# Patient Record
Sex: FEMALE | Race: BLACK OR AFRICAN AMERICAN | NOT HISPANIC OR LATINO | URBAN - METROPOLITAN AREA
[De-identification: names, ages, dates, MRNs, and addresses within clinical notes are randomized per-mention and may not be internally consistent; named-entity substitution may affect disease eponyms.]

---

## 2017-10-02 ENCOUNTER — EMERGENCY (EMERGENCY)
Facility: HOSPITAL | Age: 22
LOS: 1 days | Discharge: HOME | End: 2017-10-02
Attending: EMERGENCY MEDICINE
Payer: MEDICAID

## 2017-10-02 VITALS
OXYGEN SATURATION: 98 % | TEMPERATURE: 98 F | DIASTOLIC BLOOD PRESSURE: 78 MMHG | RESPIRATION RATE: 16 BRPM | SYSTOLIC BLOOD PRESSURE: 126 MMHG | HEART RATE: 72 BPM

## 2017-10-02 VITALS
TEMPERATURE: 97 F | HEIGHT: 68 IN | RESPIRATION RATE: 16 BRPM | SYSTOLIC BLOOD PRESSURE: 131 MMHG | WEIGHT: 186.07 LBS | HEART RATE: 73 BPM | DIASTOLIC BLOOD PRESSURE: 91 MMHG | OXYGEN SATURATION: 100 %

## 2017-10-02 DIAGNOSIS — M25.461 EFFUSION, RIGHT KNEE: ICD-10-CM

## 2017-10-02 PROCEDURE — 73562 X-RAY EXAM OF KNEE 3: CPT | Mod: 26,RT

## 2017-10-02 PROCEDURE — 93971 EXTREMITY STUDY: CPT

## 2017-10-02 PROCEDURE — 73562 X-RAY EXAM OF KNEE 3: CPT

## 2017-10-02 PROCEDURE — 93971 EXTREMITY STUDY: CPT | Mod: 26,RT

## 2017-10-02 PROCEDURE — 99283 EMERGENCY DEPT VISIT LOW MDM: CPT

## 2017-10-02 PROCEDURE — 99284 EMERGENCY DEPT VISIT MOD MDM: CPT | Mod: 25

## 2017-10-02 RX ORDER — IBUPROFEN 200 MG
600 TABLET ORAL ONCE
Qty: 0 | Refills: 0 | Status: COMPLETED | OUTPATIENT
Start: 2017-10-02 | End: 2017-10-02

## 2017-10-02 RX ORDER — DICLOFENAC SODIUM 75 MG/1
1 TABLET, DELAYED RELEASE ORAL
Qty: 10 | Refills: 0 | OUTPATIENT
Start: 2017-10-02 | End: 2017-10-07

## 2017-10-02 RX ADMIN — Medication 600 MILLIGRAM(S): at 21:33

## 2017-10-02 RX ADMIN — Medication 600 MILLIGRAM(S): at 20:51

## 2017-10-02 NOTE — ED PROVIDER NOTE - NEUROLOGICAL, MLM
Neuro: CNII-XII intact. Gait steady. Speech clear. Reflexes 2+/4 in all extremities. Strength 5/5 in all extremities. Normal coordination.

## 2017-10-02 NOTE — ED PROVIDER NOTE - LOWER EXTREMITY EXAM, RIGHT
small effusion, mild anterior tenderness, FROM, joint stable, no erythema, no warmth, no posterior or lateral tenderness

## 2017-10-02 NOTE — ED PROVIDER NOTE - OBJECTIVE STATEMENT
Patient reports she was sitting at work today, began to feel pain in her right knee. Intermittently radiates up thigh or down calf. No injury, focal weakness, paresthesias, fever, cp, sob, rash, burning or itching sensation.

## 2017-10-02 NOTE — ED PROVIDER NOTE - MEDICAL DECISION MAKING DETAILS
Patient with atraumatic knee effusion. No evidence of septic arthritis. To f/u with ortho in 1 week.

## 2018-09-01 ENCOUNTER — OUTPATIENT (OUTPATIENT)
Dept: OUTPATIENT SERVICES | Facility: HOSPITAL | Age: 23
LOS: 1 days | End: 2018-09-01
Payer: MEDICAID

## 2018-09-01 PROCEDURE — G9001: CPT

## 2018-09-03 ENCOUNTER — EMERGENCY (EMERGENCY)
Facility: HOSPITAL | Age: 23
LOS: 1 days | Discharge: HOME | End: 2018-09-03
Attending: EMERGENCY MEDICINE
Payer: MEDICAID

## 2018-09-03 VITALS
HEIGHT: 68 IN | RESPIRATION RATE: 18 BRPM | WEIGHT: 205.91 LBS | TEMPERATURE: 99 F | HEART RATE: 88 BPM | SYSTOLIC BLOOD PRESSURE: 151 MMHG | DIASTOLIC BLOOD PRESSURE: 84 MMHG | OXYGEN SATURATION: 100 %

## 2018-09-03 LAB
ALBUMIN SERPL ELPH-MCNC: 3.2 G/DL — LOW (ref 3.5–5)
ANION GAP SERPL CALC-SCNC: 9 MMOL/L — SIGNIFICANT CHANGE UP (ref 5–17)
APPEARANCE UR: SIGNIFICANT CHANGE UP
BASOPHILS # BLD AUTO: 0.2 K/UL — SIGNIFICANT CHANGE UP (ref 0–0.2)
BASOPHILS NFR BLD AUTO: 1.6 % — SIGNIFICANT CHANGE UP (ref 0–2)
BILIRUB UR-MCNC: NEGATIVE — SIGNIFICANT CHANGE UP
BUN SERPL-MCNC: 12 MG/DL — SIGNIFICANT CHANGE UP (ref 7–18)
CALCIUM SERPL-MCNC: 8.9 MG/DL — SIGNIFICANT CHANGE UP (ref 8.4–10.5)
CHLORIDE SERPL-SCNC: 108 MMOL/L — SIGNIFICANT CHANGE UP (ref 96–108)
CO2 SERPL-SCNC: 24 MMOL/L — SIGNIFICANT CHANGE UP (ref 22–31)
COLOR SPEC: YELLOW — SIGNIFICANT CHANGE UP
DIFF PNL FLD: ABNORMAL
EOSINOPHIL # BLD AUTO: 0.2 K/UL — SIGNIFICANT CHANGE UP (ref 0–0.5)
EOSINOPHIL NFR BLD AUTO: 1.9 % — SIGNIFICANT CHANGE UP (ref 0–6)
GLUCOSE SERPL-MCNC: 99 MG/DL — SIGNIFICANT CHANGE UP (ref 70–99)
GLUCOSE UR QL: NEGATIVE — SIGNIFICANT CHANGE UP
HCG UR QL: NEGATIVE — SIGNIFICANT CHANGE UP
HCT VFR BLD CALC: 25.5 % — LOW (ref 34.5–45)
HGB BLD-MCNC: 7.3 G/DL — LOW (ref 11.5–15.5)
KETONES UR-MCNC: NEGATIVE — SIGNIFICANT CHANGE UP
LEUKOCYTE ESTERASE UR-ACNC: ABNORMAL
LYMPHOCYTES # BLD AUTO: 37.5 % — SIGNIFICANT CHANGE UP (ref 13–44)
LYMPHOCYTES # BLD AUTO: 4.9 K/UL — HIGH (ref 1–3.3)
MCHC RBC-ENTMCNC: 23.5 PG — LOW (ref 27–34)
MCHC RBC-ENTMCNC: 28.6 GM/DL — LOW (ref 32–36)
MCV RBC AUTO: 82.2 FL — SIGNIFICANT CHANGE UP (ref 80–100)
MONOCYTES # BLD AUTO: 0.8 K/UL — SIGNIFICANT CHANGE UP (ref 0–0.9)
MONOCYTES NFR BLD AUTO: 6.4 % — SIGNIFICANT CHANGE UP (ref 2–14)
NEUTROPHILS # BLD AUTO: 6.9 K/UL — SIGNIFICANT CHANGE UP (ref 1.8–7.4)
NEUTROPHILS NFR BLD AUTO: 52.6 % — SIGNIFICANT CHANGE UP (ref 43–77)
NITRITE UR-MCNC: NEGATIVE — SIGNIFICANT CHANGE UP
PH UR: 7 — SIGNIFICANT CHANGE UP (ref 5–8)
PLATELET # BLD AUTO: 494 K/UL — HIGH (ref 150–400)
POTASSIUM SERPL-MCNC: 3.8 MMOL/L — SIGNIFICANT CHANGE UP (ref 3.5–5.3)
POTASSIUM SERPL-SCNC: 3.8 MMOL/L — SIGNIFICANT CHANGE UP (ref 3.5–5.3)
PROT UR-MCNC: 30 MG/DL
RBC # BLD: 3.1 M/UL — LOW (ref 3.8–5.2)
RBC # FLD: 16 % — HIGH (ref 10.3–14.5)
SODIUM SERPL-SCNC: 141 MMOL/L — SIGNIFICANT CHANGE UP (ref 135–145)
SP GR SPEC: 1.01 — SIGNIFICANT CHANGE UP (ref 1.01–1.02)
UROBILINOGEN FLD QL: NEGATIVE — SIGNIFICANT CHANGE UP
WBC # BLD: 13 K/UL — HIGH (ref 3.8–10.5)
WBC # FLD AUTO: 13 K/UL — HIGH (ref 3.8–10.5)

## 2018-09-03 PROCEDURE — 99285 EMERGENCY DEPT VISIT HI MDM: CPT | Mod: 25

## 2018-09-03 RX ORDER — SODIUM CHLORIDE 9 MG/ML
3 INJECTION INTRAMUSCULAR; INTRAVENOUS; SUBCUTANEOUS ONCE
Qty: 0 | Refills: 0 | Status: COMPLETED | OUTPATIENT
Start: 2018-09-03 | End: 2018-09-03

## 2018-09-03 RX ORDER — MORPHINE SULFATE 50 MG/1
2 CAPSULE, EXTENDED RELEASE ORAL ONCE
Qty: 0 | Refills: 0 | Status: DISCONTINUED | OUTPATIENT
Start: 2018-09-03 | End: 2018-09-03

## 2018-09-03 RX ORDER — ACETAMINOPHEN 500 MG
1000 TABLET ORAL ONCE
Qty: 0 | Refills: 0 | Status: COMPLETED | OUTPATIENT
Start: 2018-09-03 | End: 2018-09-04

## 2018-09-03 RX ADMIN — SODIUM CHLORIDE 3 MILLILITER(S): 9 INJECTION INTRAMUSCULAR; INTRAVENOUS; SUBCUTANEOUS at 23:35

## 2018-09-03 RX ADMIN — MORPHINE SULFATE 2 MILLIGRAM(S): 50 CAPSULE, EXTENDED RELEASE ORAL at 23:35

## 2018-09-03 NOTE — ED PROVIDER NOTE - PROGRESS NOTE DETAILS
labs show H/H 7.3/25, pelvic US unremarkable  discussed above with patient, pt reports she is currently taking Junel Fe for persistent vaginal bleeding but reports recently she is feeling lightheaded.  GYN consulted who offered patient blood transfusion but patient refused but consents to IV iron infusion prior to discharge home with rx iron tabs. patient advised to f/u with GYN as outpatient for reevaluation.

## 2018-09-03 NOTE — ED PROVIDER NOTE - GASTROINTESTINAL, MLM
Left message for father informing him that the form he dropped off on Monday is completed and ready to be picked up at our .   Moderate tenderness of suprapubic region.

## 2018-09-03 NOTE — ED PROVIDER NOTE - OBJECTIVE STATEMENT
22 y/o F pt with a significant PMHx of ovarian cysts and no PSHx of presents to the ED c/o pelvic pain x 1 week ago and worsened yesterday. Patient reports she has nausea. Patient states LMP was August 25th and green vaginal discharge. Patient denies fever, dysuria, hematuria, or any other complaints. NKDA.

## 2018-09-03 NOTE — ED ADULT NURSE NOTE - NSIMPLEMENTINTERV_GEN_ALL_ED
Implemented All Universal Safety Interventions:  [place] to call system. Call bell, personal items and telephone within reach. Instruct patient to call for assistance. Room bathroom lighting operational. Non-slip footwear when patient is off stretcher. Physically safe environment: no spills, clutter or unnecessary equipment. Stretcher in lowest position, wheels locked, appropriate side rails in place.

## 2018-09-03 NOTE — ED PROVIDER NOTE - MEDICAL DECISION MAKING DETAILS
22 y/o F pt with ovarian cysts presents pelvic pain. Will obtain labs, UA, pelvic US to r/o torsion, give morphine for pain, and reassess.

## 2018-09-03 NOTE — ED PROVIDER NOTE - CONDUCTED A DETAILED DISCUSSION WITH PATIENT AND/OR GUARDIAN REGARDING, MDM
lab results/radiology results radiology results/need for outpatient follow-up/return to ED if symptoms worsen, persist or questions arise/lab results

## 2018-09-04 VITALS
SYSTOLIC BLOOD PRESSURE: 127 MMHG | HEART RATE: 90 BPM | RESPIRATION RATE: 18 BRPM | TEMPERATURE: 98 F | OXYGEN SATURATION: 100 % | DIASTOLIC BLOOD PRESSURE: 85 MMHG

## 2018-09-04 DIAGNOSIS — N93.9 ABNORMAL UTERINE AND VAGINAL BLEEDING, UNSPECIFIED: ICD-10-CM

## 2018-09-04 DIAGNOSIS — D64.9 ANEMIA, UNSPECIFIED: ICD-10-CM

## 2018-09-04 LAB
ALP SERPL-CCNC: 54 U/L — SIGNIFICANT CHANGE UP (ref 40–120)
ALT FLD-CCNC: 34 U/L DA — SIGNIFICANT CHANGE UP (ref 10–60)
AST SERPL-CCNC: 19 U/L — SIGNIFICANT CHANGE UP (ref 10–40)
BILIRUB SERPL-MCNC: 0.1 MG/DL — LOW (ref 0.2–1.2)
C TRACH RRNA SPEC QL NAA+PROBE: SIGNIFICANT CHANGE UP
CREAT SERPL-MCNC: 0.71 MG/DL — SIGNIFICANT CHANGE UP (ref 0.5–1.3)
HCG SERPL-ACNC: <1 MIU/ML — SIGNIFICANT CHANGE UP
N GONORRHOEA RRNA SPEC QL NAA+PROBE: SIGNIFICANT CHANGE UP
PROT SERPL-MCNC: 7.7 G/DL — SIGNIFICANT CHANGE UP (ref 6–8.3)
SPECIMEN SOURCE: SIGNIFICANT CHANGE UP

## 2018-09-04 PROCEDURE — 99284 EMERGENCY DEPT VISIT MOD MDM: CPT | Mod: 25

## 2018-09-04 PROCEDURE — 76830 TRANSVAGINAL US NON-OB: CPT

## 2018-09-04 PROCEDURE — 87086 URINE CULTURE/COLONY COUNT: CPT

## 2018-09-04 PROCEDURE — 80053 COMPREHEN METABOLIC PANEL: CPT

## 2018-09-04 PROCEDURE — 81001 URINALYSIS AUTO W/SCOPE: CPT

## 2018-09-04 PROCEDURE — 96375 TX/PRO/DX INJ NEW DRUG ADDON: CPT

## 2018-09-04 PROCEDURE — 96374 THER/PROPH/DIAG INJ IV PUSH: CPT

## 2018-09-04 PROCEDURE — 76856 US EXAM PELVIC COMPLETE: CPT | Mod: 26

## 2018-09-04 PROCEDURE — 85027 COMPLETE CBC AUTOMATED: CPT

## 2018-09-04 PROCEDURE — 87491 CHLMYD TRACH DNA AMP PROBE: CPT

## 2018-09-04 PROCEDURE — 76830 TRANSVAGINAL US NON-OB: CPT | Mod: 26

## 2018-09-04 PROCEDURE — 87591 N.GONORRHOEAE DNA AMP PROB: CPT

## 2018-09-04 PROCEDURE — 76856 US EXAM PELVIC COMPLETE: CPT

## 2018-09-04 PROCEDURE — 81025 URINE PREGNANCY TEST: CPT

## 2018-09-04 PROCEDURE — 84702 CHORIONIC GONADOTROPIN TEST: CPT

## 2018-09-04 RX ORDER — DOCUSATE SODIUM 100 MG
1 CAPSULE ORAL
Qty: 60 | Refills: 0 | OUTPATIENT
Start: 2018-09-04 | End: 2018-10-03

## 2018-09-04 RX ORDER — FERROUS SULFATE 325(65) MG
1 TABLET ORAL
Qty: 90 | Refills: 0 | OUTPATIENT
Start: 2018-09-04 | End: 2018-10-03

## 2018-09-04 RX ORDER — IRON SUCROSE 20 MG/ML
200 INJECTION, SOLUTION INTRAVENOUS ONCE
Qty: 0 | Refills: 0 | Status: COMPLETED | OUTPATIENT
Start: 2018-09-04 | End: 2018-09-04

## 2018-09-04 RX ADMIN — IRON SUCROSE 110 MILLIGRAM(S): 20 INJECTION, SOLUTION INTRAVENOUS at 03:30

## 2018-09-04 RX ADMIN — Medication 400 MILLIGRAM(S): at 01:09

## 2018-09-04 RX ADMIN — Medication 1000 MILLIGRAM(S): at 01:36

## 2018-09-04 RX ADMIN — MORPHINE SULFATE 2 MILLIGRAM(S): 50 CAPSULE, EXTENDED RELEASE ORAL at 00:11

## 2018-09-04 NOTE — CONSULT NOTE ADULT - PROBLEM SELECTOR RECOMMENDATION 9
- Patient was offered blood transfusion, however states she feels fine and does not want transfusion, refusal to transfusion signed and in chart  - IV venofer x 1  - ERx Iron TID and Colace  - Patient instructed to take 2 pills/day for the next 3 days of her OCP to help control the bleeding  - Instructed to follow up with Dr. Anderson this week for further evaluation  - If any worsening of symptoms, syncope, chest pain, SOB, or worsening dizziness, return to ED.  - Patient understands and agrees with plan; questions answered to patient's satisfaction  -d/w Dr. Anderson

## 2018-09-04 NOTE — CONSULT NOTE ADULT - SUBJECTIVE AND OBJECTIVE BOX
HPI: 23y  LMP , no significant pmhx, presents to ED with vaginal bleeding since . Patient states that her period started on  and has been continuous since. She states that she has been changing her tampon ~8hrs but the tampon is not drenched through. Patient has a hx of AUB that started in  of this year. She states that previously her periods have been normal before the heavy bleeding started. At that time she was bleeding heavily with clots and found to have an ovarian cyst.  She was placed on birth control about a month ago which lightened her bleeding. She states that she felt dizzy earlier today in the house, but feels fine now. Denies chest pain or SOB. States that she is not sure if she is anemic, but "whenever I try to give blood my hgb is too low."     pob/gynhx: , LMP , ETOP with D&C x2, last in ; hx of ovarian cyst; denies abnormal pap smears, fibroids, stds  pmhx: AUB; anemia  pshx: d&c x 2  all: nka  meds: Junel FE  sochx: social etoh and social marijuana use    REVIEW OF SYSTEMS: see HPI	    PE:  Vital Signs Last 24 Hrs  T(C): 36.7 (04 Sep 2018 00:23), Max: 37.4 (03 Sep 2018 22:46)  T(F): 98 (04 Sep 2018 00:23), Max: 99.4 (03 Sep 2018 22:46)  HR: 90 (04 Sep 2018 00:23) (88 - 90)  BP: 127/85 (04 Sep 2018 00:23) (127/85 - 151/84)  BP(mean): --  RR: 18 (04 Sep 2018 00:23) (18 - 18)  SpO2: 100% (04 Sep 2018 00:23) (100% - 100%)    gen: well appearing pleasant female in NAD  abd: soft, nt, nd, no rebound or guarding  pelvis: os appears closed; moderate amount of dark blood in vagina, no active bleeding; no discharge or foul odor     LABS:                        7.3    13.0  )-----------( 494      ( 03 Sep 2018 23:38 )             25.5     09-03    141  |  108  |  12  ----------------------------<  99  3.8   |  24  |  0.71    Ca    8.9      03 Sep 2018 23:38    TPro  7.7  /  Alb  3.2<L>  /  TBili  0.1<L>  /  DBili  x   /  AST  19  /  ALT  34  /  AlkPhos  54  -      Urinalysis Basic - ( 03 Sep 2018 23:38 )    Color: Yellow / Appearance: Hazy / S.015 / pH: x  Gluc: x / Ketone: Negative  / Bili: Negative / Urobili: Negative   Blood: x / Protein: 30 mg/dL / Nitrite: Negative   Leuk Esterase: Small / RBC: 25-50 /HPF / WBC 3-5 /HPF   Sq Epi: x / Non Sq Epi: Occasional /HPF / Bacteria: Trace /HPF    Hcg neg    RADIOLOGY & ADDITIONAL STUDIES:             < from: US Pelvis Complete (18 @ 00:42) >  EXAM:  US PELVIC COMPLETE                          EXAM:  US TRANSVAGINAL                            PROCEDURE DATE:  2018        INTERPRETATION:  CLINICAL INFORMATION: Pelvic pain. Rule out torsion.    LMP: 2018    COMPARISON: None available.    TECHNIQUE: Transabdominal and Endovaginal pelvic sonogram. Color and   Spectral Doppler was performed.    FINDINGS:    Uterus: 7.3 x 4.7 x 5.2 cm. mildly echogenic material identified within   the region of the endocervical canal, may represent blood products.  Endometrium: 6 mm. Within normal limits.  Right ovary: 2.7 x 2.0 x 2.5 cm. Within normal limits. Normal ovarian   blood flow is demonstrated utilizing color and spectral Doppler.  Left ovary: 3.2 x 2.0 x 2.9 cm. Within normal limits. Normal ovarian   blood flow is demonstrated utilizing color and spectral Doppler.  Free fluid: Small within the cul-de-sac.    IMPRESSION:    Unremarkable sonographic evaluation of the ovaries.    < end of copied text >      a/p: 22yo , LMP , with vaginal bleeding and anemia.  - Patient was offered blood transfusion, however states she feels fine, no longer dizzy, and does not want transfusion, refusal to transfusion signed and in chart  - IV venofer x 1  - ERx Iron TID and Colace  - Patient instructed to take 2 pills/day for the next 3 days of her OCP to help control the bleeding  - Instructed to follow up with Dr. Anderson this week for further evaluation  - If any worsening of symptoms, syncope, chest pain, SOB, or worsening dizziness, return to ED.  - Patient understands and agrees with plan; questions answered to patient's satisfaction  -d/w Dr. Anderson

## 2018-09-05 LAB
CULTURE RESULTS: SIGNIFICANT CHANGE UP
SPECIMEN SOURCE: SIGNIFICANT CHANGE UP

## 2018-09-10 DIAGNOSIS — Z71.89 OTHER SPECIFIED COUNSELING: ICD-10-CM

## 2019-04-15 ENCOUNTER — EMERGENCY (EMERGENCY)
Facility: HOSPITAL | Age: 24
LOS: 1 days | Discharge: HOME | End: 2019-04-15
Attending: EMERGENCY MEDICINE
Payer: SELF-PAY

## 2019-04-15 VITALS
OXYGEN SATURATION: 96 % | HEIGHT: 67 IN | DIASTOLIC BLOOD PRESSURE: 89 MMHG | WEIGHT: 205.03 LBS | RESPIRATION RATE: 16 BRPM | SYSTOLIC BLOOD PRESSURE: 146 MMHG | TEMPERATURE: 99 F | HEART RATE: 113 BPM

## 2019-04-15 PROCEDURE — 99053 MED SERV 10PM-8AM 24 HR FAC: CPT

## 2019-04-15 PROCEDURE — 99284 EMERGENCY DEPT VISIT MOD MDM: CPT | Mod: 25

## 2019-04-16 VITALS
HEART RATE: 72 BPM | TEMPERATURE: 98 F | OXYGEN SATURATION: 100 % | RESPIRATION RATE: 18 BRPM | SYSTOLIC BLOOD PRESSURE: 126 MMHG | DIASTOLIC BLOOD PRESSURE: 79 MMHG

## 2019-04-16 PROBLEM — N83.209 UNSPECIFIED OVARIAN CYST, UNSPECIFIED SIDE: Chronic | Status: ACTIVE | Noted: 2018-09-04

## 2019-04-16 LAB
D DIMER BLD IA.RAPID-MCNC: 263 NG/ML DDU — HIGH
HCG SERPL-ACNC: <1 MIU/ML — SIGNIFICANT CHANGE UP

## 2019-04-16 PROCEDURE — 84702 CHORIONIC GONADOTROPIN TEST: CPT

## 2019-04-16 PROCEDURE — 71275 CT ANGIOGRAPHY CHEST: CPT | Mod: 26

## 2019-04-16 PROCEDURE — 93005 ELECTROCARDIOGRAM TRACING: CPT

## 2019-04-16 PROCEDURE — 71046 X-RAY EXAM CHEST 2 VIEWS: CPT

## 2019-04-16 PROCEDURE — 85379 FIBRIN DEGRADATION QUANT: CPT

## 2019-04-16 PROCEDURE — 71275 CT ANGIOGRAPHY CHEST: CPT

## 2019-04-16 PROCEDURE — 99284 EMERGENCY DEPT VISIT MOD MDM: CPT | Mod: 25

## 2019-04-16 PROCEDURE — 71046 X-RAY EXAM CHEST 2 VIEWS: CPT | Mod: 26

## 2019-04-16 PROCEDURE — 36415 COLL VENOUS BLD VENIPUNCTURE: CPT

## 2019-04-16 NOTE — ED PROVIDER NOTE - OBJECTIVE STATEMENT
Chief complaint of intermittent chest pain x 1 week. No shortness of breath. No fever, no coughing.   pt is on OCPs and admits to smoking cigarettes.  lungs CTA b/l   CVS RRR S1S2  No calf tenderness. Chief complaint of intermittent chest discomfort x 1 week. No shortness of breath. No fever, no coughing.   Pt is on OCPs and admits to occasionally smoking cigarettes.

## 2019-04-16 NOTE — ED ADULT NURSE NOTE - NSIMPLEMENTINTERV_GEN_ALL_ED
Implemented All Universal Safety Interventions:  [place] to call system. Call bell, personal items and telephone within reach. Instruct patient to call for assistance. Room bathroom lighting operational. Non-slip footwear when patient is off stretcher. Physically safe environment: no spills, clutter or unnecessary equipment. Stretcher in lowest position, wheels locked, appropriate side rails in place.

## 2019-04-16 NOTE — ED ADULT NURSE NOTE - ED STAT RN HANDOFF DETAILS
Patient discharged home as per MD order, IV access removed no redness, swelling or bleeding noted at site. All discharge instructions and F/U visits provided to patient. Patient verbalizes understanding leaving ambulatory in no acute distress.

## 2019-04-16 NOTE — ED PROVIDER NOTE - NSFOLLOWUPCLINICS_GEN_ALL_ED_FT
[facility]  [facility]  [address] [facility] - [address] [place] [address]  Phone: [phone]  Fax: [phone]  Follow Up Time:

## 2019-04-16 NOTE — ED PROVIDER NOTE - CLINICAL SUMMARY MEDICAL DECISION MAKING FREE TEXT BOX
7a- No PE reported on CT. Pt is well appearing walking with normal gait, stable for discharge and follow up with medical doctor. Pt educated on care and need for follow up. Discussed anticipatory guidance and return precautions. Questions answered. I had a detailed discussion with the patient and/or guardian regarding the historical points, exam findings, and any diagnostic results supporting the discharge diagnosis.  HR 88 bpm

## 2019-04-16 NOTE — ED PROVIDER NOTE - NSFOLLOWUPINSTRUCTIONS_ED_ALL_ED_FT
Return to ER immediately if your chest pain returns, if you have pain with radiation to arms, back or neck, if you feel short of breath, feel weak, feel fast or pounding heart beating, if you have any fever or vomiting.  If you need assistance with follow up appointments our Care Coordinator can be reached at [phone]. In addition the [facility] is located at [address] [place] [id number], tel: [phone]. c

## 2019-08-20 ENCOUNTER — EMERGENCY (EMERGENCY)
Facility: HOSPITAL | Age: 24
LOS: 1 days | Discharge: HOME | End: 2019-08-20
Attending: EMERGENCY MEDICINE
Payer: MEDICAID

## 2019-08-20 VITALS
OXYGEN SATURATION: 99 % | SYSTOLIC BLOOD PRESSURE: 129 MMHG | HEART RATE: 81 BPM | RESPIRATION RATE: 18 BRPM | DIASTOLIC BLOOD PRESSURE: 76 MMHG | TEMPERATURE: 99 F

## 2019-08-20 VITALS
TEMPERATURE: 99 F | OXYGEN SATURATION: 100 % | RESPIRATION RATE: 20 BRPM | WEIGHT: 201.06 LBS | HEIGHT: 68 IN | DIASTOLIC BLOOD PRESSURE: 84 MMHG | HEART RATE: 86 BPM | SYSTOLIC BLOOD PRESSURE: 134 MMHG

## 2019-08-20 LAB
ALBUMIN SERPL ELPH-MCNC: 3.5 G/DL — SIGNIFICANT CHANGE UP (ref 3.5–5)
ALP SERPL-CCNC: 56 U/L — SIGNIFICANT CHANGE UP (ref 40–120)
ALT FLD-CCNC: 25 U/L DA — SIGNIFICANT CHANGE UP (ref 10–60)
ANION GAP SERPL CALC-SCNC: 10 MMOL/L — SIGNIFICANT CHANGE UP (ref 5–17)
AST SERPL-CCNC: 21 U/L — SIGNIFICANT CHANGE UP (ref 10–40)
BILIRUB SERPL-MCNC: 0.2 MG/DL — SIGNIFICANT CHANGE UP (ref 0.2–1.2)
BUN SERPL-MCNC: 12 MG/DL — SIGNIFICANT CHANGE UP (ref 7–18)
CALCIUM SERPL-MCNC: 9.1 MG/DL — SIGNIFICANT CHANGE UP (ref 8.4–10.5)
CHLORIDE SERPL-SCNC: 106 MMOL/L — SIGNIFICANT CHANGE UP (ref 96–108)
CO2 SERPL-SCNC: 22 MMOL/L — SIGNIFICANT CHANGE UP (ref 22–31)
CREAT SERPL-MCNC: 0.76 MG/DL — SIGNIFICANT CHANGE UP (ref 0.5–1.3)
GLUCOSE SERPL-MCNC: 90 MG/DL — SIGNIFICANT CHANGE UP (ref 70–99)
HCG SERPL-ACNC: <1 MIU/ML — SIGNIFICANT CHANGE UP
HCT VFR BLD CALC: 39.5 % — SIGNIFICANT CHANGE UP (ref 34.5–45)
HGB BLD-MCNC: 12.1 G/DL — SIGNIFICANT CHANGE UP (ref 11.5–15.5)
MCHC RBC-ENTMCNC: 28.2 PG — SIGNIFICANT CHANGE UP (ref 27–34)
MCHC RBC-ENTMCNC: 30.6 GM/DL — LOW (ref 32–36)
MCV RBC AUTO: 92.1 FL — SIGNIFICANT CHANGE UP (ref 80–100)
NRBC # BLD: 0 /100 WBCS — SIGNIFICANT CHANGE UP (ref 0–0)
PLATELET # BLD AUTO: 350 K/UL — SIGNIFICANT CHANGE UP (ref 150–400)
POTASSIUM SERPL-MCNC: 3.9 MMOL/L — SIGNIFICANT CHANGE UP (ref 3.5–5.3)
POTASSIUM SERPL-SCNC: 3.9 MMOL/L — SIGNIFICANT CHANGE UP (ref 3.5–5.3)
PROT SERPL-MCNC: 8.1 G/DL — SIGNIFICANT CHANGE UP (ref 6–8.3)
RBC # BLD: 4.29 M/UL — SIGNIFICANT CHANGE UP (ref 3.8–5.2)
RBC # FLD: 14.2 % — SIGNIFICANT CHANGE UP (ref 10.3–14.5)
SODIUM SERPL-SCNC: 138 MMOL/L — SIGNIFICANT CHANGE UP (ref 135–145)
TROPONIN I SERPL-MCNC: <0.015 NG/ML — SIGNIFICANT CHANGE UP (ref 0–0.04)
WBC # BLD: 10.18 K/UL — SIGNIFICANT CHANGE UP (ref 3.8–10.5)
WBC # FLD AUTO: 10.18 K/UL — SIGNIFICANT CHANGE UP (ref 3.8–10.5)

## 2019-08-20 PROCEDURE — 99284 EMERGENCY DEPT VISIT MOD MDM: CPT | Mod: 25

## 2019-08-20 PROCEDURE — 36415 COLL VENOUS BLD VENIPUNCTURE: CPT

## 2019-08-20 PROCEDURE — 80053 COMPREHEN METABOLIC PANEL: CPT

## 2019-08-20 PROCEDURE — 93005 ELECTROCARDIOGRAM TRACING: CPT

## 2019-08-20 PROCEDURE — 71046 X-RAY EXAM CHEST 2 VIEWS: CPT

## 2019-08-20 PROCEDURE — 99284 EMERGENCY DEPT VISIT MOD MDM: CPT

## 2019-08-20 PROCEDURE — 71046 X-RAY EXAM CHEST 2 VIEWS: CPT | Mod: 26

## 2019-08-20 PROCEDURE — 84484 ASSAY OF TROPONIN QUANT: CPT

## 2019-08-20 PROCEDURE — 84702 CHORIONIC GONADOTROPIN TEST: CPT

## 2019-08-20 PROCEDURE — 85027 COMPLETE CBC AUTOMATED: CPT

## 2019-08-20 RX ORDER — LIDOCAINE 4 G/100G
10 CREAM TOPICAL ONCE
Refills: 0 | Status: COMPLETED | OUTPATIENT
Start: 2019-08-20 | End: 2019-08-20

## 2019-08-20 RX ORDER — FAMOTIDINE 10 MG/ML
20 INJECTION INTRAVENOUS ONCE
Refills: 0 | Status: COMPLETED | OUTPATIENT
Start: 2019-08-20 | End: 2019-08-20

## 2019-08-20 RX ADMIN — LIDOCAINE 10 MILLILITER(S): 4 CREAM TOPICAL at 21:43

## 2019-08-20 RX ADMIN — FAMOTIDINE 20 MILLIGRAM(S): 10 INJECTION INTRAVENOUS at 21:43

## 2019-08-20 RX ADMIN — Medication 30 MILLILITER(S): at 21:43

## 2019-08-20 NOTE — ED PROVIDER NOTE - CARDIAC, MLM
Normal rate, regular rhythm.  Heart sounds S1, S2.  No murmurs, rubs or gallops. equal radial pulses 2+

## 2019-08-20 NOTE — ED ADULT NURSE NOTE - OBJECTIVE STATEMENT
Pt compliant of have chest tightness that started around 5:45 this morning. No acute distress noted, no shortness of breath indicated. Safety maintained.

## 2019-08-20 NOTE — ED PROVIDER NOTE - OBJECTIVE STATEMENT
24 yr old female with hx of anxiety on OCP presents to ed c/o substernal chest tightness since 545am constant.  pt states feels like a hot sensation.  3 day ago went drinking and 2 day ago have severe retching vomiting. no fever, no chills, no visual changes, no headache, no cough, no palpitations, no leg swelling, no syncope, no abd pain, no dysuria, no rashes.  pt states worse when at rest and improves with movement/walking. tolerating.

## 2019-08-20 NOTE — ED PROVIDER NOTE - CLINICAL SUMMARY MEDICAL DECISION MAKING FREE TEXT BOX
24 yr old female with hx of anxiety on OCP presents to ed c/o substernal chest tightness since 545am constant.  pt states feels like a hot sensation.  3 day ago went drinking and 2 day ago have severe retching vomiting. no fever, no chills, no visual changes, no headache, no cough, no palpitations, no leg swelling, no syncope, no abd pain, no dysuria, no rashes.  pt states worse when at rest and improves with movement/walking. tolerating.    hot chest pressure improves with ambulating and exertion. which should worsens with cardiopulm disease.  likely reflux vs anxiety.  labs, cxr, ekg.

## 2019-08-20 NOTE — ED PROVIDER NOTE - PROGRESS NOTE DETAILS
aponte: labs unremarkable.  Dx gastritis. can take pepcid. avoid spicy, oily, hot foods, NSAIDs, alcohol. f/u with gi and pcp.  left ambulatory

## 2020-03-18 ENCOUNTER — EMERGENCY (EMERGENCY)
Facility: HOSPITAL | Age: 25
LOS: 1 days | Discharge: HOME | End: 2020-03-18
Attending: EMERGENCY MEDICINE
Payer: MEDICAID

## 2020-03-18 VITALS
SYSTOLIC BLOOD PRESSURE: 113 MMHG | RESPIRATION RATE: 18 BRPM | TEMPERATURE: 99 F | HEART RATE: 86 BPM | OXYGEN SATURATION: 94 % | DIASTOLIC BLOOD PRESSURE: 70 MMHG

## 2020-03-18 VITALS
SYSTOLIC BLOOD PRESSURE: 134 MMHG | WEIGHT: 207.9 LBS | RESPIRATION RATE: 18 BRPM | OXYGEN SATURATION: 100 % | HEART RATE: 105 BPM | TEMPERATURE: 99 F | DIASTOLIC BLOOD PRESSURE: 88 MMHG

## 2020-03-18 LAB
FLU A RESULT: SIGNIFICANT CHANGE UP
FLU A RESULT: SIGNIFICANT CHANGE UP
FLUAV AG NPH QL: SIGNIFICANT CHANGE UP
FLUBV AG NPH QL: SIGNIFICANT CHANGE UP
RSV RESULT: SIGNIFICANT CHANGE UP
RSV RNA RESP QL NAA+PROBE: SIGNIFICANT CHANGE UP

## 2020-03-18 PROCEDURE — 87798 DETECT AGENT NOS DNA AMP: CPT

## 2020-03-18 PROCEDURE — 87633 RESP VIRUS 12-25 TARGETS: CPT

## 2020-03-18 PROCEDURE — 87486 CHLMYD PNEUM DNA AMP PROBE: CPT

## 2020-03-18 PROCEDURE — 99282 EMERGENCY DEPT VISIT SF MDM: CPT

## 2020-03-18 PROCEDURE — 87631 RESP VIRUS 3-5 TARGETS: CPT

## 2020-03-18 PROCEDURE — 99284 EMERGENCY DEPT VISIT MOD MDM: CPT

## 2020-03-18 PROCEDURE — U0001: CPT

## 2020-03-18 PROCEDURE — 87581 M.PNEUMON DNA AMP PROBE: CPT

## 2020-03-18 NOTE — ED PROVIDER NOTE - PATIENT PORTAL LINK FT
You can access the FollowMyHealth Patient Portal offered by [facility] by registering at the following website: [email or web address]. By joining [employer]’s FollowMyHealth portal, you will also be able to view your health information using other applications (apps) compatible with our system.

## 2020-03-18 NOTE — ED PROVIDER NOTE - OBJECTIVE STATEMENT
24 yo female with no PMHx, no allergies, smokes marijuana, presents with productive cough x2 days after having contact with someone who tested positive for corona virus. 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 Patients states that per the security cameras, the only contact she had with the covid positive patient was the exchanging of money. Pt states that shortly after the exchange, she wiped down her area with Clorox but she presents to the ED concerned as she did not wash her hands or use hand  afterwards. Patient denies fever or shortness of breath. 26 yo female with no PMHx, no allergies, smokes marijuana (no vaping), presents with productive cough x2 days after having contact with someone who tested positive for corona virus. 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 Patients states that per the security cameras, the only contact she had with the covid positive patient was the exchanging of money. Pt states that shortly after the exchange, she wiped down her area with Clorox but she presents to the ED concerned as she did not wash her hands or use hand  afterwards. Patient denies fever, shortness of breath, or any other symptoms.

## 2020-03-18 NOTE — ED PROVIDER NOTE - PHYSICAL EXAMINATION
Afebrile, hemodynamically stable, saturating well  NAD, well appearing, no WOB  Head NCAT  EOMI grossly, anicteric  MMM  RRR, nml S1/S2, no m/r/g  Lungs CTAB, no w/r/r  AAO, CN's 3-12 grossly intact  MENJIVAR spontaneously, no leg cyanosis or edema  Skin warm, well perfused, no rashes or hives

## 2020-03-18 NOTE — ED PROVIDER NOTE - CLINICAL SUMMARY MEDICAL DECISION MAKING FREE TEXT BOX
No e/o PNA or fluid overload on exam. Patient is very well appearing, NAD, afebrile, hemodynamically stable. No WOB. Tested for covid. Discharged with instructions in further symptomatic care, need for quarantine, return precautions, and need for PMD f/u.

## 2020-03-18 NOTE — ED ADULT TRIAGE NOTE - CHIEF COMPLAINT QUOTE
as per pt " I was told I got in contact with positive covid patient I want to get tested for covid "

## 2020-03-18 NOTE — ED PROVIDER NOTE - NSFOLLOWUPINSTRUCTIONS_ED_ALL_ED_FT
Please use acetaminophen or ibuprofen as needed for fever.  Please self-quarantine for 7 days and check for fever every day.  Please return to the emergency department if you have shortness of breath or any other concerns.

## 2020-03-21 LAB — RAPID RVP RESULT: SIGNIFICANT CHANGE UP

## 2020-03-23 LAB — SARS-COV-2 RNA SPEC QL NAA+PROBE: DETECTED

## 2020-03-31 ENCOUNTER — TRANSCRIPTION ENCOUNTER (OUTPATIENT)
Age: 25
End: 2020-03-31

## 2020-08-16 ENCOUNTER — EMERGENCY (EMERGENCY)
Facility: HOSPITAL | Age: 25
LOS: 1 days | Discharge: HOME | End: 2020-08-16
Attending: STUDENT IN AN ORGANIZED HEALTH CARE EDUCATION/TRAINING PROGRAM
Payer: MEDICAID

## 2020-08-16 VITALS
OXYGEN SATURATION: 98 % | TEMPERATURE: 98 F | SYSTOLIC BLOOD PRESSURE: 130 MMHG | HEART RATE: 88 BPM | DIASTOLIC BLOOD PRESSURE: 70 MMHG | RESPIRATION RATE: 17 BRPM

## 2020-08-16 VITALS
HEIGHT: 68.5 IN | OXYGEN SATURATION: 97 % | HEART RATE: 103 BPM | DIASTOLIC BLOOD PRESSURE: 85 MMHG | SYSTOLIC BLOOD PRESSURE: 140 MMHG | TEMPERATURE: 98 F | WEIGHT: 235.89 LBS | RESPIRATION RATE: 19 BRPM

## 2020-08-16 LAB
ALBUMIN SERPL ELPH-MCNC: 3.5 G/DL — SIGNIFICANT CHANGE UP (ref 3.5–5)
ALP SERPL-CCNC: 71 U/L — SIGNIFICANT CHANGE UP (ref 40–120)
ALT FLD-CCNC: 33 U/L DA — SIGNIFICANT CHANGE UP (ref 10–60)
ANION GAP SERPL CALC-SCNC: 7 MMOL/L — SIGNIFICANT CHANGE UP (ref 5–17)
AST SERPL-CCNC: 30 U/L — SIGNIFICANT CHANGE UP (ref 10–40)
BASOPHILS # BLD AUTO: 0.03 K/UL — SIGNIFICANT CHANGE UP (ref 0–0.2)
BASOPHILS NFR BLD AUTO: 0.3 % — SIGNIFICANT CHANGE UP (ref 0–2)
BILIRUB SERPL-MCNC: 0.1 MG/DL — LOW (ref 0.2–1.2)
BUN SERPL-MCNC: 11 MG/DL — SIGNIFICANT CHANGE UP (ref 7–18)
CALCIUM SERPL-MCNC: 9.4 MG/DL — SIGNIFICANT CHANGE UP (ref 8.4–10.5)
CHLORIDE SERPL-SCNC: 107 MMOL/L — SIGNIFICANT CHANGE UP (ref 96–108)
CO2 SERPL-SCNC: 26 MMOL/L — SIGNIFICANT CHANGE UP (ref 22–31)
CREAT SERPL-MCNC: 0.86 MG/DL — SIGNIFICANT CHANGE UP (ref 0.5–1.3)
EOSINOPHIL # BLD AUTO: 0.28 K/UL — SIGNIFICANT CHANGE UP (ref 0–0.5)
EOSINOPHIL NFR BLD AUTO: 2.4 % — SIGNIFICANT CHANGE UP (ref 0–6)
GLUCOSE SERPL-MCNC: 113 MG/DL — HIGH (ref 70–99)
HCG SERPL-ACNC: <1 MIU/ML — SIGNIFICANT CHANGE UP
HCT VFR BLD CALC: 41.1 % — SIGNIFICANT CHANGE UP (ref 34.5–45)
HGB BLD-MCNC: 12.5 G/DL — SIGNIFICANT CHANGE UP (ref 11.5–15.5)
IMM GRANULOCYTES NFR BLD AUTO: 0.4 % — SIGNIFICANT CHANGE UP (ref 0–1.5)
LYMPHOCYTES # BLD AUTO: 4.96 K/UL — HIGH (ref 1–3.3)
LYMPHOCYTES # BLD AUTO: 43.2 % — SIGNIFICANT CHANGE UP (ref 13–44)
MAGNESIUM SERPL-MCNC: 2.1 MG/DL — SIGNIFICANT CHANGE UP (ref 1.6–2.6)
MCHC RBC-ENTMCNC: 28 PG — SIGNIFICANT CHANGE UP (ref 27–34)
MCHC RBC-ENTMCNC: 30.4 GM/DL — LOW (ref 32–36)
MCV RBC AUTO: 91.9 FL — SIGNIFICANT CHANGE UP (ref 80–100)
MONOCYTES # BLD AUTO: 0.56 K/UL — SIGNIFICANT CHANGE UP (ref 0–0.9)
MONOCYTES NFR BLD AUTO: 4.9 % — SIGNIFICANT CHANGE UP (ref 2–14)
NEUTROPHILS # BLD AUTO: 5.59 K/UL — SIGNIFICANT CHANGE UP (ref 1.8–7.4)
NEUTROPHILS NFR BLD AUTO: 48.8 % — SIGNIFICANT CHANGE UP (ref 43–77)
NRBC # BLD: 0 /100 WBCS — SIGNIFICANT CHANGE UP (ref 0–0)
PLATELET # BLD AUTO: 326 K/UL — SIGNIFICANT CHANGE UP (ref 150–400)
POTASSIUM SERPL-MCNC: 3.9 MMOL/L — SIGNIFICANT CHANGE UP (ref 3.5–5.3)
POTASSIUM SERPL-SCNC: 3.9 MMOL/L — SIGNIFICANT CHANGE UP (ref 3.5–5.3)
PROT SERPL-MCNC: 8.2 G/DL — SIGNIFICANT CHANGE UP (ref 6–8.3)
RBC # BLD: 4.47 M/UL — SIGNIFICANT CHANGE UP (ref 3.8–5.2)
RBC # FLD: 14.2 % — SIGNIFICANT CHANGE UP (ref 10.3–14.5)
SODIUM SERPL-SCNC: 140 MMOL/L — SIGNIFICANT CHANGE UP (ref 135–145)
WBC # BLD: 11.27 K/UL — HIGH (ref 3.8–10.5)
WBC # FLD AUTO: 11.27 K/UL — HIGH (ref 3.8–10.5)

## 2020-08-16 PROCEDURE — 36415 COLL VENOUS BLD VENIPUNCTURE: CPT

## 2020-08-16 PROCEDURE — 80053 COMPREHEN METABOLIC PANEL: CPT

## 2020-08-16 PROCEDURE — 83735 ASSAY OF MAGNESIUM: CPT

## 2020-08-16 PROCEDURE — 99284 EMERGENCY DEPT VISIT MOD MDM: CPT

## 2020-08-16 PROCEDURE — 84702 CHORIONIC GONADOTROPIN TEST: CPT

## 2020-08-16 PROCEDURE — 96374 THER/PROPH/DIAG INJ IV PUSH: CPT

## 2020-08-16 PROCEDURE — 99284 EMERGENCY DEPT VISIT MOD MDM: CPT | Mod: 25

## 2020-08-16 PROCEDURE — 85027 COMPLETE CBC AUTOMATED: CPT

## 2020-08-16 RX ORDER — ACETAMINOPHEN 500 MG
975 TABLET ORAL ONCE
Refills: 0 | Status: COMPLETED | OUTPATIENT
Start: 2020-08-16 | End: 2020-08-16

## 2020-08-16 RX ORDER — SODIUM CHLORIDE 9 MG/ML
1000 INJECTION INTRAMUSCULAR; INTRAVENOUS; SUBCUTANEOUS ONCE
Refills: 0 | Status: COMPLETED | OUTPATIENT
Start: 2020-08-16 | End: 2020-08-16

## 2020-08-16 RX ORDER — METOCLOPRAMIDE HCL 10 MG
10 TABLET ORAL ONCE
Refills: 0 | Status: COMPLETED | OUTPATIENT
Start: 2020-08-16 | End: 2020-08-16

## 2020-08-16 RX ADMIN — SODIUM CHLORIDE 1000 MILLILITER(S): 9 INJECTION INTRAMUSCULAR; INTRAVENOUS; SUBCUTANEOUS at 22:16

## 2020-08-16 RX ADMIN — Medication 975 MILLIGRAM(S): at 21:17

## 2020-08-16 RX ADMIN — Medication 10 MILLIGRAM(S): at 22:16

## 2020-08-16 NOTE — ED PROVIDER NOTE - PATIENT PORTAL LINK FT
You can access the FollowMyHealth Patient Portal offered by [facility] by registering at the following website: [email or web address]. By joining [employer]’s FollowMyHealth portal, you will also be able to view your health information using other applications (apps) compatible with our system.

## 2020-08-16 NOTE — ED PROVIDER NOTE - CLINICAL SUMMARY MEDICAL DECISION MAKING FREE TEXT BOX
25F presenting with headache and lightheadedness. no nausea, vomiting or changes in vision. non-focal neuro exam. likely tension headache. plan for labs, symptom control. will reassess.

## 2020-08-16 NOTE — ED PROVIDER NOTE - PHYSICAL EXAMINATION
General: well appearing female, no acute distress   HEENT: normocephalic, atraumatic, EOMI   Respiratory: normal work of breathing, lungs clear to auscultation bilaterally   Cardiac: regular rate and rhythm   Abdomen: soft, non-tender, no guarding or rebound   MSK: no swelling or tenderness of lower extremities, moving all extremities spontaneously   Skin: warm, dry   Neuro: A&Ox3, cranial nerves II-xII intact, 5/5 strength in all extremities, no sensory deficits   Psych: appropriate affect

## 2020-08-16 NOTE — ED PROVIDER NOTE - NSFOLLOWUPINSTRUCTIONS_ED_ALL_ED_FT
You were seen in the emergency department for headache and lightheadedness.    Please follow-up with your primary care doctor in the next 24-48 hours.     Please continue to drink plenty of fluids.     If you have any worsening symptoms, severe headache, nausea, vomiting or changes in vision, please return to the emergency department.

## 2020-08-16 NOTE — ED ADULT NURSE NOTE - OBJECTIVE STATEMENT
pt came in c/o head pressure, pt is aox3 , no acute distress pt came in c/o head pressure, pt is aox3 , no acute distress, denies chest pain, abdominal pain , nausea and vomiting

## 2020-08-16 NOTE — ED PROVIDER NOTE - OBJECTIVE STATEMENT
25F, no pmh, presenting with two days of head  pressure. reports neck pain and feeling lightheaded as well. no changes in vision, nausea, vomiting, chest pain, shortness of breath, abdominal pain, pain or burning with urination, pain or swelling of lower extremities.

## 2022-05-07 ENCOUNTER — EMERGENCY (EMERGENCY)
Facility: HOSPITAL | Age: 27
LOS: 1 days | Discharge: HOME | End: 2022-05-07
Attending: EMERGENCY MEDICINE
Payer: COMMERCIAL

## 2022-05-07 VITALS
HEIGHT: 68 IN | TEMPERATURE: 98 F | DIASTOLIC BLOOD PRESSURE: 79 MMHG | WEIGHT: 225.09 LBS | SYSTOLIC BLOOD PRESSURE: 146 MMHG | RESPIRATION RATE: 17 BRPM | HEART RATE: 89 BPM | OXYGEN SATURATION: 99 %

## 2022-05-07 PROCEDURE — 73562 X-RAY EXAM OF KNEE 3: CPT | Mod: 26,LT

## 2022-05-07 PROCEDURE — 73562 X-RAY EXAM OF KNEE 3: CPT

## 2022-05-07 PROCEDURE — 99283 EMERGENCY DEPT VISIT LOW MDM: CPT | Mod: 25

## 2022-05-07 PROCEDURE — 99283 EMERGENCY DEPT VISIT LOW MDM: CPT

## 2022-05-07 RX ORDER — IBUPROFEN 200 MG
400 TABLET ORAL ONCE
Refills: 0 | Status: COMPLETED | OUTPATIENT
Start: 2022-05-07 | End: 2022-05-07

## 2022-05-07 RX ADMIN — Medication 400 MILLIGRAM(S): at 14:17

## 2022-05-07 RX ADMIN — Medication 400 MILLIGRAM(S): at 14:41

## 2022-05-07 NOTE — ED ADULT NURSE NOTE - CAS EDN DISCHARGE INTERVENTIONS
"Clarence \"Arnav\" OLIMPIA Spring - Patient Advocate Liason (PAL)  [facility]    " left knee ace wrap

## 2022-05-07 NOTE — ED PROVIDER NOTE - NSFOLLOWUPINSTRUCTIONS_ED_ALL_ED_FT
Log Out.      "[employer] CareNotes®     :  [facility]  	                     KNEE PAIN - AfterCare(R) Instructions(ER/ED)           Knee Pain    WHAT YOU NEED TO KNOW:    Knee pain may start suddenly, or it may be a long-term problem. You may have pain on the side, front, or back of your knee. You may have knee stiffness and swelling. You may hear popping sounds or feel like your knee is giving way or locking up as you walk. You may feel pain when you sit, stand, walk, or climb up and down stairs. Knee pain can be caused by conditions such as obesity, inflammation, or strains or tears in ligaments or tendons.     DISCHARGE INSTRUCTIONS:    Return to the emergency department if:   •Your pain is worse, even after treatment.       •You cannot bend or straighten your leg completely.       •The swelling around your knee does not go down even with treatment.      •Your knee is painful and hot to the touch.       Contact your healthcare provider if:   •You have questions or concerns about your condition or care.           Medicines: You may need any of the following:   •NSAIDs help decrease swelling and pain or fever. This medicine is available with or without a doctor's order. NSAIDs can cause stomach bleeding or kidney problems in certain people. If you take blood thinner medicine, always ask your healthcare provider if NSAIDs are safe for you. Always read the medicine label and follow directions.      •Acetaminophen decreases pain and fever. It is available without a doctor's order. Ask how much to take and how often to take it. Follow directions. Read the labels of all other medicines you are using to see if they also contain acetaminophen, or ask your doctor or pharmacist. Acetaminophen can cause liver damage if not taken correctly.       •Prescription pain medicine may be given. Ask your healthcare provider how to take this medicine safely. Some prescription pain medicines contain acetaminophen. Do not take other medicines that contain acetaminophen without talking to your healthcare provider. Too much acetaminophen may cause liver damage. Prescription pain medicine may cause constipation. Ask your healthcare provider how to prevent or treat constipation.       •Take your medicine as directed. Contact your healthcare provider if you think your medicine is not helping or if you have side effects. Tell him or her if you are allergic to any medicine. Keep a list of the medicines, vitamins, and herbs you take. Include the amounts, and when and why you take them. Bring the list or the pill bottles to follow-up visits. Carry your medicine list with you in case of an emergency.      What you can do to manage your symptoms:   •Rest your knee so it can heal. Limit activities that increase your pain. Do low-impact exercises, such as walking or swimming.       •Apply ice to help reduce swelling and pain. Use an ice pack, or put crushed ice in a plastic bag. Cover it with a towel before you apply it to your knee. Apply ice for 15 to 20 minutes every hour, or as directed.      •Apply compression to help reduce swelling. Use a brace or bandage only as directed.      •Elevate your knee to help decrease pain and swelling. Elevate your knee while you are sitting or lying down. Prop your leg on pillows to keep your knee above the level of your heart.      •Prevent your knee from moving as directed. Your healthcare provider may put on a cast or splint. You may need to wear a leg brace to stabilize your knee. A leg brace can be adjusted to increase your range of motion as your knee heals.  Hinged Knee Braces            What you can do to prevent knee pain:   •Maintain a healthy weight. Extra weight increases your risk for knee pain. Ask your healthcare provider how much you should weigh. He or she can help you create a safe weight loss plan if you need to lose weight.      •Exercise or train properly. Use the correct equipment for sports. Wear shoes that provide good support. Check your posture often as you exercise, play sports, or train for an event. This can help prevent stress and strain on your knees. Rest between sessions so you do not overwork your knees.      Follow up with your healthcare provider within 24 hours or as directed: You may need follow-up treatments, such as steroid injections to decrease pain. Write down your questions so you remember to ask them during your visits.        © Copyright [employer] 2022           back to top                          © Copyright [employer] 2022

## 2022-05-07 NOTE — ED PROVIDER NOTE - OBJECTIVE STATEMENT
28 y/o female with left knee pain s/p fall. Patient reports she was dancing in celebration of [facility] when she injured left knee after she slipped and fell. TISHDA.

## 2022-05-07 NOTE — ED PROVIDER NOTE - MUSCULOSKELETAL, MLM
Mild swelling noted over the left knee. Spine appears normal, range of motion is not limited, no muscle or joint tenderness

## 2022-05-07 NOTE — ED PROVIDER NOTE - PATIENT PORTAL LINK FT
You can access the FollowMyHealth Patient Portal offered by [facility] by registering at the following website: [email or web address]. By joining [employer]’s FollowMyHealth portal, you will also be able to view your health information using other applications (apps) compatible with our system.

## 2023-01-09 NOTE — ED ADULT NURSE NOTE - NSFALLRSKASSESASSIST_ED_ALL_ED
Well developed, well nourished, in no acute distress, ambulating without difficulty, normal communication ability
no

## 2024-01-16 NOTE — ED PROVIDER NOTE - CONSTITUTIONAL, MLM
Unable to contact patient, left message to call office.     normal... Well appearing, well nourished, awake, alert, oriented to person, place, time/situation and in no apparent distress.

## 2024-04-03 NOTE — ED ADULT NURSE NOTE - NS_NURSE_DISC_ED_ALL_ED_PROVIDEDBY
FAMILY HISTORY:  Mother  Still living? Unknown  Family history of diabetes mellitus, Age at diagnosis: Age Unknown     ED MD

## 2024-11-17 NOTE — ED PROVIDER NOTE - GENITOURINARY SPECULUM EXAM
Interval History:    MEDICATIONS  (STANDING):  ALPRAZolam 0.5 milliGRAM(s) Oral at bedtime  ARIPiprazole 2 milliGRAM(s) Oral daily  dextrose 5%. 1000 milliLiter(s) (100 mL/Hr) IV Continuous <Continuous>  dextrose 5%. 1000 milliLiter(s) (50 mL/Hr) IV Continuous <Continuous>  dextrose 50% Injectable 25 Gram(s) IV Push once  dextrose 50% Injectable 12.5 Gram(s) IV Push once  dextrose 50% Injectable 25 Gram(s) IV Push once  erythromycin     enteric coated 250 milliGRAM(s) Oral three times a day  escitalopram 10 milliGRAM(s) Oral daily  FIRST- Mouthwash  BLM 5 milliLiter(s) Swish and Swallow three times a day  fluticasone propionate/ salmeterol 250-50 MICROgram(s) Diskus 1 Dose(s) Inhalation two times a day  glucagon  Injectable 1 milliGRAM(s) IntraMuscular once  influenza   Vaccine 0.5 milliLiter(s) IntraMuscular once  insulin lispro (ADMELOG) corrective regimen sliding scale   SubCutaneous every 6 hours  metoclopramide 10 milliGRAM(s) Oral three times a day before meals  metoprolol succinate  milliGRAM(s) Oral daily  montelukast 10 milliGRAM(s) Oral at bedtime  pantoprazole    Tablet 40 milliGRAM(s) Oral before breakfast  rosuvastatin 10 milliGRAM(s) Oral at bedtime    MEDICATIONS  (PRN):  acetaminophen     Tablet .. 650 milliGRAM(s) Oral every 6 hours PRN Moderate Pain (4 - 6)  acetaminophen 325 mG/butalbital 50 mG/caffeine 40 mG 1 Tablet(s) Oral every 8 hours PRN migraine  albuterol/ipratropium for Nebulization 3 milliLiter(s) Nebulizer every 4 hours PRN Shortness of Breath and/or Wheezing  ALPRAZolam 0.5 milliGRAM(s) Oral three times a day PRN anxiety  aluminum hydroxide/magnesium hydroxide/simethicone Suspension 30 milliLiter(s) Oral every 4 hours PRN Dyspepsia  benzocaine/menthol Lozenge 1 Lozenge Oral every 4 hours PRN Sore Throat  dextrose Oral Gel 15 Gram(s) Oral once PRN Blood Glucose LESS THAN 70 milliGRAM(s)/deciliter  guaiFENesin Oral Liquid (Sugar-Free) 200 milliGRAM(s) Oral every 6 hours PRN Cough  HYDROmorphone  Injectable 0.5 milliGRAM(s) IV Push every 4 hours PRN Severe Pain (7 - 10)  ondansetron Injectable 4 milliGRAM(s) IV Push every 6 hours PRN Nausea and/or Vomiting      Daily     Daily   BMI: 20.6 (11-13 @ 08:07)  Change in Weight:  Vital Signs Last 24 Hrs  T(C): 37.2 (17 Nov 2024 09:00), Max: 37.6 (16 Nov 2024 23:30)  T(F): 98.9 (17 Nov 2024 09:00), Max: 99.6 (16 Nov 2024 23:30)  HR: 122 (17 Nov 2024 09:00) (102 - 125)  BP: 115/55 (17 Nov 2024 09:00) (92/47 - 115/55)  BP(mean): 65 (17 Nov 2024 08:14) (65 - 65)  RR: 19 (17 Nov 2024 09:00) (17 - 20)  SpO2: 95% (17 Nov 2024 09:00) (92% - 98%)    Parameters below as of 17 Nov 2024 09:00  Patient On (Oxygen Delivery Method): nasal cannula  O2 Flow (L/min): 2    I&O's Detail      PHYSICAL EXAM  HEENT:    Normal appearance of conjunctiva, ears, nose, lips, oropharynx, and oral mucosa, anicteric.  Neck:  No masses, no asymmetry.  Lymph Nodes:  No lymphadenopathy.   Cardiovascular:  RRR normal S1/S2, no murmur.  Respiratory:  CTA B/L, normal respiratory effort.   Abdominal:   soft, no masses Lower abdomimal tenderness, Lab Results:    11-16    135  |  104  |  20  ----------------------------<  398[H]  4.1   |  25  |  0.93    Ca    8.8      16 Nov 2024 04:11  Phos  2.9     11-16  Mg     1.8     11-16    TPro  7.4  /  Alb  2.9[L]  /  TBili  0.3  /  DBili  x   /  AST  23  /  ALT  38  /  AlkPhos  101  11-16    LIVER FUNCTIONS - ( 16 Nov 2024 04:11 )  Alb: 2.9 g/dL / Pro: 7.4 gm/dL / ALK PHOS: 101 U/L / ALT: 38 U/L / AST: 23 U/L / GGT: x                 Stool Results:          RADIOLOGY RESULTS:    SURGICAL PATHOLOGY:      Moderate blood within vaginal canal. Uterine tenderness to palpation.

## 2025-01-06 NOTE — ED PROVIDER NOTE - PRINCIPAL DIAGNOSIS
previous_has_had_a_previous_microneedling_treatment
Have You Had Microneedling Treatments Before?: has had a previous microneedling treatment
Chest pain

## 2025-06-24 ENCOUNTER — EMERGENCY (EMERGENCY)
Facility: HOSPITAL | Age: 30
LOS: 1 days | End: 2025-06-24
Attending: STUDENT IN AN ORGANIZED HEALTH CARE EDUCATION/TRAINING PROGRAM
Payer: COMMERCIAL

## 2025-06-24 VITALS
HEART RATE: 72 BPM | TEMPERATURE: 98 F | DIASTOLIC BLOOD PRESSURE: 77 MMHG | HEIGHT: 68 IN | RESPIRATION RATE: 18 BRPM | WEIGHT: 201.94 LBS | SYSTOLIC BLOOD PRESSURE: 127 MMHG | OXYGEN SATURATION: 99 %

## 2025-06-24 PROCEDURE — 99283 EMERGENCY DEPT VISIT LOW MDM: CPT | Mod: 25

## 2025-06-24 PROCEDURE — 90377 RABIES IG HT&SOL HUMAN IM/SC: CPT

## 2025-06-24 PROCEDURE — 96372 THER/PROPH/DIAG INJ SC/IM: CPT

## 2025-06-24 PROCEDURE — 90471 IMMUNIZATION ADMIN: CPT

## 2025-06-24 PROCEDURE — 99284 EMERGENCY DEPT VISIT MOD MDM: CPT

## 2025-06-24 PROCEDURE — 90675 RABIES VACCINE IM: CPT

## 2025-06-24 RX ORDER — IBUPROFEN 200 MG
1 TABLET ORAL
Qty: 20 | Refills: 0
Start: 2025-06-24

## 2025-06-24 RX ORDER — AMOXICILLIN AND CLAVULANATE POTASSIUM 500; 125 MG/1; MG/1
1 TABLET, FILM COATED ORAL
Qty: 14 | Refills: 0
Start: 2025-06-24 | End: 2025-06-30

## 2025-06-24 RX ORDER — RABIES IMMUNE GLOBULIN (HUMAN) 150 [IU]/ML
1800 INJECTION INTRAMUSCULAR ONCE
Refills: 0 | Status: COMPLETED | OUTPATIENT
Start: 2025-06-24 | End: 2025-06-24

## 2025-06-24 RX ORDER — IBUPROFEN 200 MG
600 TABLET ORAL ONCE
Refills: 0 | Status: COMPLETED | OUTPATIENT
Start: 2025-06-24 | End: 2025-06-24

## 2025-06-24 RX ORDER — AMOXICILLIN AND CLAVULANATE POTASSIUM 500; 125 MG/1; MG/1
1 TABLET, FILM COATED ORAL ONCE
Refills: 0 | Status: COMPLETED | OUTPATIENT
Start: 2025-06-24 | End: 2025-06-24

## 2025-06-24 RX ADMIN — RABIES IMMUNE GLOBULIN (HUMAN) 1800 UNIT(S): 150 INJECTION INTRAMUSCULAR at 10:45

## 2025-06-24 RX ADMIN — Medication 600 MILLIGRAM(S): at 10:24

## 2025-06-24 RX ADMIN — Medication 600 MILLIGRAM(S): at 10:54

## 2025-06-24 RX ADMIN — Medication 1 MILLILITER(S): at 10:25

## 2025-06-24 RX ADMIN — AMOXICILLIN AND CLAVULANATE POTASSIUM 1 TABLET(S): 500; 125 TABLET, FILM COATED ORAL at 10:24

## 2025-06-24 NOTE — ED PROVIDER NOTE - NSFOLLOWUPINSTRUCTIONS_ED_ALL_ED_FT
Rest and stay well-hydrated.    Return on day 3 (June 27), day 7 (July 1) , and day 14 (July 8) for remaining rabies vaccine series.    Return immediately with any new or worsening symptoms including fevers, redness around bite wound, purulent drainage, severe pain, or any additional concerns.

## 2025-06-24 NOTE — ED PROVIDER NOTE - PATIENT PORTAL LINK FT
You can access the FollowMyHealth Patient Portal offered by [facility] by registering at the following website: [email or web address]. By joining [facility]’s FollowMyHealth portal, you will also be able to view your health information using other applications (apps) compatible with our system.

## 2025-06-24 NOTE — ED PROVIDER NOTE - CARE PLAN
1 Principal Discharge DX:	Bite from cat  Secondary Diagnosis:	Need for post exposure prophylaxis for rabies

## 2025-06-24 NOTE — ED PROVIDER NOTE - CLINICAL SUMMARY MEDICAL DECISION MAKING FREE TEXT BOX
Zenaida: 30-year-old female with no prior past medical history presents status post cat bite today.  Patient states she was walking her dog and was bit on her right thigh by a stray cat unprovoked.  Patient states cat bite went through her pants and pierced her skin.  [facility], states wound was cleansed, advised to go to the emergency department for rabies prophylaxis.  Last Tdap 3 months ago.  Denies any fevers, numbness, weakness.  Physical exam per above.  Patient with multiple small puncture wounds to right thigh, no active bleeding, compartments soft, extremity neurovascular intact.  Will provide rabies prophylaxis, empiric antibiotics, discussed supportive treatment, return for rabies series/return precautions, patient understood and agreeable with plan.

## 2025-06-24 NOTE — ED ADULT NURSE NOTE - CAS ELECT INFOMATION PROVIDED
Return to the ED for rabies vaccination on 6/27, 7/1, and 7/8. Follow-up with Primary Care Provider or return to the ED for new or worsening symptoms./DC instructions

## 2025-06-24 NOTE — ED ADULT NURSE NOTE - TEMPLATE
Last visit:  12/30/19  Next visit:5/29/20  Last labs: 12/30/19    Rx requested: Lamotrigine   Pharmacy: [facility] in [place]   
Bites

## 2025-06-24 NOTE — ED PROVIDER NOTE - OBJECTIVE STATEMENT
30-year-old female with no prior past medical history presents status post cat bite today.  Patient states she was walking her dog and was bit on her right thigh by a stray cat unprovoked.  Patient states cat bite went through her pants and pierced her skin.  [facility] states [facility], states wound was cleansed, advised to go to the emergency department for rabies prophylaxis.  Last Tdap 3 months ago.  Denies any fevers, numbness, weakness.  Denies any additional complaints.

## 2025-06-24 NOTE — ED PROVIDER NOTE - PHYSICAL EXAMINATION
CONSTITUTIONAL: non-toxic, well appearing  SKIN: no rash, no petechiae.  EYES: pink conjunctiva, anicteric  NECK: Supple; FROM  CARD: extremities warm, dry, well perfused  RESP: no respiratory distress  ABD: non-distended  EXT: No edema. RLE multiple small puncture wounds to anterior thigh, no active bleeding, compartments soft, extremity NVI.   NEURO: Alert, oriented. Steady gait.   PSYCH: Cooperative, appropriate.

## 2025-06-24 NOTE — ED ADULT NURSE NOTE - OBJECTIVE STATEMENT
29 yo female sitting on a chair c/o a stray cat bite on the right thigh this morning. Tdap is up-to-date; 3 months ago.

## 2025-06-27 ENCOUNTER — EMERGENCY (EMERGENCY)
Facility: HOSPITAL | Age: 30
LOS: 1 days | End: 2025-06-27
Attending: STUDENT IN AN ORGANIZED HEALTH CARE EDUCATION/TRAINING PROGRAM
Payer: COMMERCIAL

## 2025-06-27 VITALS
SYSTOLIC BLOOD PRESSURE: 131 MMHG | HEART RATE: 78 BPM | WEIGHT: 201.94 LBS | TEMPERATURE: 98 F | RESPIRATION RATE: 16 BRPM | DIASTOLIC BLOOD PRESSURE: 71 MMHG | HEIGHT: 68 IN

## 2025-06-27 PROCEDURE — 99281 EMR DPT VST MAYX REQ PHY/QHP: CPT | Mod: 25

## 2025-06-27 PROCEDURE — 90471 IMMUNIZATION ADMIN: CPT

## 2025-06-27 PROCEDURE — L9995: CPT

## 2025-06-27 PROCEDURE — 90675 RABIES VACCINE IM: CPT

## 2025-06-27 RX ADMIN — Medication 1 MILLILITER(S): at 16:07

## 2025-06-27 NOTE — ED PROVIDER NOTE - NSFOLLOWUPINSTRUCTIONS_ED_ALL_ED_FT
Please return on day 7 (July 1) and day 14 (July 8) for remaining rabies vaccination series      Rabies Vaccine    WHAT YOU NEED TO KNOW:    What is the rabies vaccine? The rabies vaccine can prevent rabies. Rabies is a serious illness caused by a virus. The rabies virus is spread to humans through the bite or scratch of an infected animal. Dogs, bats, skunks, coyotes, raccoons, and foxes are examples of animals that can carry rabies. The rabies vaccine can protect you from infection if you are at high risk of exposure. The vaccine can also prevent infection after you are bitten by an infected animal.    When is the vaccine given? The rabies vaccine is not part of the usual vaccination schedule. Your healthcare provider will give you an injection schedule. You should receive a vaccine if you have a higher risk of exposure to rabies. This might include people who work with animals who may be infected with rabies. You should also receive the vaccine after being bitten or scratched by an infected animal. The following is a common dosing schedule:    Before possible exposure to the virus, the vaccine is given in 2 doses. The first dose can be given at any time. The second dose is given 7 days after the first. You may need a booster dose within 3 years of the first 2 doses.    After exposure to the virus, the vaccine is usually given in 2 or 4 doses:  If you have received the rabies vaccine in the past, you usually only need 2 doses. The first is given immediately and the second is given 3 days later.    If you have not received the rabies vaccine, you need 4 doses over 2 weeks. The first dose is given as soon as possible after exposure to rabies. The following doses are given on days 3, 7, and 14. A shot called rabies immune globulin is given at the same time as the first dose. This medicine helps your immune system fight the infection.  What should I do if I miss a dose or will miss a scheduled dose? Call your healthcare provider right away. He or she will tell you what to do. The best way to be protected is to stay on the injection schedule given to you. This is especially important if you are getting the vaccine after exposure to the rabies virus. Reschedule any makeup dose or upcoming dose for as close to the original appointment as possible. Remember that you cannot get more than 1 dose on any day.    Who should not get the rabies vaccine or should wait to get it? Your healthcare provider may have you wait if you have not been exposed to rabies but are at high risk. If you have been exposed, you need to get the vaccine as soon as possible. Tell the provider if:    You had an allergic reaction to the rabies vaccine in the past, or to another vaccine.    You have any allergies.    You have a weakened immune system.    You take chloroquine or a similar medicine.    You are sick or have a fever of 101°F (38.3°C) or higher.  What are the risks of the rabies vaccine? The injection area may become red, tender, or swollen. You may develop a headache or muscle aches. You may have nausea or pain in your abdomen. You may have an allergic reaction to the vaccine. This can be life-threatening.    Call your local emergency number (911 in the [place]) or have someone call if:    Your mouth and throat are swollen.    You are wheezing or have trouble breathing.    You have chest pain or your heart is beating faster than normal for you.    You feel like you are going to faint.  When should I seek immediate care?    Your face is red or swollen.    You have hives that spread over your body.    You feel weak or dizzy.  When should I call my doctor?    You have increased pain, redness, or swelling around the injection area.    You have a headache, muscle aches, or abdominal pain.    You have flu-like symptoms.    You have questions or concerns about the rabies vaccine.  CARE AGREEMENT:    You have the right to help plan your care. Learn about your health condition and how it may be treated. Discuss treatment options with your healthcare providers to decide what care you want to receive. You always have the right to refuse treatment.

## 2025-06-27 NOTE — ED PROVIDER NOTE - CLINICAL SUMMARY MEDICAL DECISION MAKING FREE TEXT BOX
Patient is a 30-year-old female with no significant past medical history presenting for repeat rabies vaccination.  Patient was seen in emergency department on 6/20/2025 after a cat bite to her right thigh.  Patient received antibiotics, antibiotic prescription, rabies immunoglobulin as well as rabies vaccination and returns on day  3 for repeat vaccination series.    Vital signs stable wound clean dry and intact.  – Will order repeat vaccine, DC with return precautions.

## 2025-06-27 NOTE — ED PROVIDER NOTE - PHYSICAL EXAMINATION
Gen: no acute distress  Head: normocephalic  Lung: CTAB, no respiratory distress, no wheezing, rales, rhonchi  CV: normal s1/s2, rrr,   Abd: soft, non-tender, non-distended  MSK: full range of motion in all 4 extremities  Neuro: No focal neurologic deficits  Skin: R thigh bite wounds c/d/i no active drainage or bleeding

## 2025-06-27 NOTE — ED PROVIDER NOTE - PATIENT PORTAL LINK FT
You can access the FollowMyHealth Patient Portal offered by [facility] by registering at the following website: [email or web address]. By joining [employer]’s FollowMyHealth portal, you will also be able to view your health information using other applications (apps) compatible with our system.

## 2025-06-27 NOTE — ED PROVIDER NOTE - OBJECTIVE STATEMENT
Patient is a 30-year-old female with no significant past medical history presenting for repeat rabies vaccination.  Patient was seen in emergency department on 6/20/2025 after a cat bite to her right thigh.  Patient received antibiotics, antibiotic prescription, rabies immunoglobulin as well as rabies vaccination and returns on day  3 for repeat vaccination series.  States the wound is healing and is clean and dry and intact without any active drainage or bleeding.  No other complaints.

## 2025-07-01 ENCOUNTER — EMERGENCY (EMERGENCY)
Facility: HOSPITAL | Age: 30
LOS: 1 days | End: 2025-07-01
Attending: EMERGENCY MEDICINE
Payer: COMMERCIAL

## 2025-07-01 VITALS
SYSTOLIC BLOOD PRESSURE: 112 MMHG | HEIGHT: 68 IN | TEMPERATURE: 98 F | DIASTOLIC BLOOD PRESSURE: 71 MMHG | OXYGEN SATURATION: 100 % | RESPIRATION RATE: 18 BRPM | HEART RATE: 65 BPM

## 2025-07-01 PROCEDURE — L9995: CPT

## 2025-07-01 PROCEDURE — 90471 IMMUNIZATION ADMIN: CPT

## 2025-07-01 PROCEDURE — 99281 EMR DPT VST MAYX REQ PHY/QHP: CPT | Mod: 25

## 2025-07-01 PROCEDURE — 90675 RABIES VACCINE IM: CPT

## 2025-07-01 RX ADMIN — Medication 1 MILLILITER(S): at 17:02

## 2025-07-01 NOTE — ED PROVIDER NOTE - PATIENT PORTAL LINK FT
You can access the FollowMyHealth Patient Portal offered by [facility] by registering at the following website: [email or web address]. By joining [employer]’s FollowMyHealth portal, you will also be able to view your health information using other applications (apps) compatible with our system.

## 2025-07-01 NOTE — ED PROVIDER NOTE - OBJECTIVE STATEMENT
30-year-old female presenting for repeat shot #3.  Patient suffered a cat bite and initiated rabies vaccine series on June 24.  Has no acute complaints.  Denies any pain or swelling to wound.  Denies any fever or chills.  Denies any body aches.  Denies any allergies to medication.

## 2025-07-10 ENCOUNTER — EMERGENCY (EMERGENCY)
Facility: HOSPITAL | Age: 30
LOS: 1 days | End: 2025-07-10
Attending: EMERGENCY MEDICINE
Payer: COMMERCIAL

## 2025-07-10 VITALS
HEIGHT: 68 IN | WEIGHT: 197.98 LBS | SYSTOLIC BLOOD PRESSURE: 138 MMHG | DIASTOLIC BLOOD PRESSURE: 80 MMHG | HEART RATE: 69 BPM | OXYGEN SATURATION: 99 % | TEMPERATURE: 99 F | RESPIRATION RATE: 18 BRPM

## 2025-07-10 PROCEDURE — 90471 IMMUNIZATION ADMIN: CPT

## 2025-07-10 PROCEDURE — 99281 EMR DPT VST MAYX REQ PHY/QHP: CPT | Mod: 25

## 2025-07-10 PROCEDURE — L9995: CPT

## 2025-07-10 PROCEDURE — 90675 RABIES VACCINE IM: CPT

## 2025-07-10 RX ADMIN — Medication 1 MILLILITER(S): at 11:10
